# Patient Record
Sex: MALE | NOT HISPANIC OR LATINO | URBAN - METROPOLITAN AREA
[De-identification: names, ages, dates, MRNs, and addresses within clinical notes are randomized per-mention and may not be internally consistent; named-entity substitution may affect disease eponyms.]

---

## 2023-07-24 ENCOUNTER — OFFICE VISIT (OUTPATIENT)
Dept: FAMILY MEDICINE | Facility: CLINIC | Age: 63
End: 2023-07-24
Payer: COMMERCIAL

## 2023-07-24 VITALS
HEIGHT: 69 IN | DIASTOLIC BLOOD PRESSURE: 80 MMHG | RESPIRATION RATE: 18 BRPM | SYSTOLIC BLOOD PRESSURE: 117 MMHG | WEIGHT: 212 LBS | BODY MASS INDEX: 31.4 KG/M2 | TEMPERATURE: 98 F

## 2023-07-24 DIAGNOSIS — E11.9 INSULIN DEPENDENT TYPE 2 DIABETES MELLITUS: ICD-10-CM

## 2023-07-24 DIAGNOSIS — M17.0 PRIMARY OSTEOARTHRITIS OF BOTH KNEES: ICD-10-CM

## 2023-07-24 DIAGNOSIS — I50.9 CONGESTIVE HEART FAILURE, UNSPECIFIED HF CHRONICITY, UNSPECIFIED HEART FAILURE TYPE: ICD-10-CM

## 2023-07-24 DIAGNOSIS — E78.2 MIXED HYPERLIPIDEMIA: ICD-10-CM

## 2023-07-24 DIAGNOSIS — Z79.4 INSULIN DEPENDENT TYPE 2 DIABETES MELLITUS: ICD-10-CM

## 2023-07-24 DIAGNOSIS — Z00.00 ANNUAL PHYSICAL EXAM: Primary | ICD-10-CM

## 2023-07-24 DIAGNOSIS — I25.10 CORONARY ARTERY DISEASE, UNSPECIFIED VESSEL OR LESION TYPE, UNSPECIFIED WHETHER ANGINA PRESENT, UNSPECIFIED WHETHER NATIVE OR TRANSPLANTED HEART: ICD-10-CM

## 2023-07-24 PROBLEM — I25.810 CORONARY ARTERY DISEASE INVOLVING CORONARY BYPASS GRAFT OF NATIVE HEART WITHOUT ANGINA PECTORIS: Status: ACTIVE | Noted: 2023-07-24

## 2023-07-24 PROCEDURE — 3074F PR MOST RECENT SYSTOLIC BLOOD PRESSURE < 130 MM HG: ICD-10-PCS | Mod: CPTII,,, | Performed by: STUDENT IN AN ORGANIZED HEALTH CARE EDUCATION/TRAINING PROGRAM

## 2023-07-24 PROCEDURE — 99386 PR PREVENTIVE VISIT,NEW,40-64: ICD-10-PCS | Mod: ,,, | Performed by: STUDENT IN AN ORGANIZED HEALTH CARE EDUCATION/TRAINING PROGRAM

## 2023-07-24 PROCEDURE — 4010F ACE/ARB THERAPY RXD/TAKEN: CPT | Mod: CPTII,,, | Performed by: STUDENT IN AN ORGANIZED HEALTH CARE EDUCATION/TRAINING PROGRAM

## 2023-07-24 PROCEDURE — 3008F PR BODY MASS INDEX (BMI) DOCUMENTED: ICD-10-PCS | Mod: CPTII,,, | Performed by: STUDENT IN AN ORGANIZED HEALTH CARE EDUCATION/TRAINING PROGRAM

## 2023-07-24 PROCEDURE — 3074F SYST BP LT 130 MM HG: CPT | Mod: CPTII,,, | Performed by: STUDENT IN AN ORGANIZED HEALTH CARE EDUCATION/TRAINING PROGRAM

## 2023-07-24 PROCEDURE — 3008F BODY MASS INDEX DOCD: CPT | Mod: CPTII,,, | Performed by: STUDENT IN AN ORGANIZED HEALTH CARE EDUCATION/TRAINING PROGRAM

## 2023-07-24 PROCEDURE — 1159F MED LIST DOCD IN RCRD: CPT | Mod: CPTII,,, | Performed by: STUDENT IN AN ORGANIZED HEALTH CARE EDUCATION/TRAINING PROGRAM

## 2023-07-24 PROCEDURE — 3079F DIAST BP 80-89 MM HG: CPT | Mod: CPTII,,, | Performed by: STUDENT IN AN ORGANIZED HEALTH CARE EDUCATION/TRAINING PROGRAM

## 2023-07-24 PROCEDURE — 1159F PR MEDICATION LIST DOCUMENTED IN MEDICAL RECORD: ICD-10-PCS | Mod: CPTII,,, | Performed by: STUDENT IN AN ORGANIZED HEALTH CARE EDUCATION/TRAINING PROGRAM

## 2023-07-24 PROCEDURE — 3079F PR MOST RECENT DIASTOLIC BLOOD PRESSURE 80-89 MM HG: ICD-10-PCS | Mod: CPTII,,, | Performed by: STUDENT IN AN ORGANIZED HEALTH CARE EDUCATION/TRAINING PROGRAM

## 2023-07-24 PROCEDURE — 4010F PR ACE/ARB THEARPY RXD/TAKEN: ICD-10-PCS | Mod: CPTII,,, | Performed by: STUDENT IN AN ORGANIZED HEALTH CARE EDUCATION/TRAINING PROGRAM

## 2023-07-24 PROCEDURE — 99386 PREV VISIT NEW AGE 40-64: CPT | Mod: ,,, | Performed by: STUDENT IN AN ORGANIZED HEALTH CARE EDUCATION/TRAINING PROGRAM

## 2023-07-24 RX ORDER — EVOLOCUMAB 140 MG/ML
140 INJECTION, SOLUTION SUBCUTANEOUS
COMMUNITY

## 2023-07-24 RX ORDER — SACUBITRIL AND VALSARTAN 24; 26 MG/1; MG/1
1 TABLET, FILM COATED ORAL 2 TIMES DAILY
COMMUNITY

## 2023-07-24 RX ORDER — FUROSEMIDE 20 MG/1
20 TABLET ORAL DAILY
COMMUNITY

## 2023-07-24 RX ORDER — CARVEDILOL 12.5 MG/1
12.5 TABLET ORAL 2 TIMES DAILY WITH MEALS
COMMUNITY

## 2023-07-24 RX ORDER — CLOPIDOGREL BISULFATE 75 MG/1
75 TABLET ORAL DAILY
COMMUNITY

## 2023-07-24 RX ORDER — AMIODARONE HYDROCHLORIDE 200 MG/1
200 TABLET ORAL DAILY
COMMUNITY

## 2023-07-24 NOTE — PROGRESS NOTES
Subjective:      Patient ID: Darian Delgado is a 63 y.o.  male.    Chief Complaint: Establish Care/Wellness    Preventative Health: Patient not amenable to any vaccinations at this time. Patient not amenable to colon CA screening at this time.    IDDMII: Patient taking Novolin 70/30 PRN based on a sliding scale.    CHF/Hx of CAGBx4/HLD: Patient following with Dr. Moshe Rahman with Cardiology. He had quadruple bypass surgery 6 weeks ago. Patient taking Amiodarone, Coreg, Plavix, Repatha, Lasix, and Entresto.    Osteoarthritis of Bilateral Knees: Patient not able to take Tylenol or NSAIDs. He completed 1 PRP injection and 1 A2M injection in [place], [place]. He doesn't want to undergo knee replacement surgery.    Nicotine Dependence: Patient started smoking at 11-12 years old. Patient was averaging 1ppd x 45 years. He quit smoking September 2021. Patient not amenable to LDCT for lung CA screening at this time.    Review of Systems   Constitutional:  Negative for activity change, appetite change, chills, diaphoresis, fatigue, fever and unexpected weight change.   Eyes:  Negative for visual disturbance.   Respiratory:  Negative for apnea, cough, shortness of breath, wheezing and stridor.    Cardiovascular:  Negative for chest pain, palpitations and leg swelling.   Gastrointestinal:  Negative for abdominal pain, blood in stool, constipation, diarrhea, nausea and vomiting.   Genitourinary:  Negative for dysuria and hematuria.   Musculoskeletal:  Positive for arthralgias (bilateral knees). Negative for gait problem, joint swelling and myalgias.   Skin:  Negative for rash and wound.   Neurological:  Negative for dizziness, syncope, weakness, numbness and headaches.   Psychiatric/Behavioral:  Negative for behavioral problems, dysphoric mood and sleep disturbance. The patient is not nervous/anxious.      Objective:   /80 (BP Location: Right arm, Patient Position: Sitting, BP Method: Small (Automatic))    "Temp 98 °F (36.7 °C) (Temporal)   Resp 18   Ht 5' 9" (1.753 m)   Wt 96.2 kg (212 lb)   BMI 31.31 kg/m²     Physical Exam  Vitals and nursing note reviewed.   Constitutional:       General: He is not in acute distress.     Appearance: Normal appearance. He is not ill-appearing or toxic-appearing.   HENT:      Head: Normocephalic and atraumatic.      Mouth/Throat:      Mouth: Mucous membranes are moist.      Pharynx: Oropharynx is clear.   Eyes:      Conjunctiva/sclera: Conjunctivae normal.   Cardiovascular:      Rate and Rhythm: Normal rate and regular rhythm.      Heart sounds: Normal heart sounds. No murmur heard.  Pulmonary:      Effort: Pulmonary effort is normal. No respiratory distress.      Breath sounds: Normal breath sounds. No wheezing.   Abdominal:      General: Bowel sounds are normal. There is no distension.      Palpations: Abdomen is soft.      Tenderness: There is no abdominal tenderness.   Musculoskeletal:         General: No deformity. Normal range of motion.      Cervical back: Neck supple. No tenderness.      Right lower leg: No edema.      Left lower leg: No edema.   Lymphadenopathy:      Cervical: No cervical adenopathy.   Skin:     General: Skin is warm and dry.      Findings: No lesion or rash.   Neurological:      General: No focal deficit present.      Mental Status: He is alert.      Motor: No weakness.      Coordination: Coordination normal.   Psychiatric:         Mood and Affect: Mood normal.         Behavior: Behavior normal.         Thought Content: Thought content normal.         Judgment: Judgment normal.     Assessment/Plan:   1. Annual physical exam  Comments:  - Patient not amenable to any vaccinations at this time.   - Patient not amenable to colon CA screening at this time.  - Medical record request for notes/labs.     2. Insulin dependent type 2 diabetes mellitus  Assessment & Plan:  - Medical record request for labs.      3. Congestive heart failure, unspecified HF " chronicity, unspecified heart failure type  Assessment & Plan:  - Stable.  - Patient following with Dr. Moshe Rahman with Cardiology.  - Amiodarone, Coreg, Plavix, Repatha, Lasix, and Entresto per Cardiology.      4. Coronary artery disease, unspecified vessel or lesion type, unspecified whether angina present, unspecified whether native or transplanted heart  Assessment & Plan:  - He had quadruple bypass surgery 6 weeks ago.   - Refer to CHF plan.      5. Mixed hyperlipidemia  Assessment & Plan:  - Patient states he his unable to take statins due to history of elevated liver enzymes.  - Refer to CHF plan.      6. Primary osteoarthritis of both knees  Assessment & Plan:  - Patient not able to take Tylenol or NSAIDs.  - He completed 1 PRP injection and 1 A2M injection in [place], [place].   - He doesn't want to undergo knee replacement surgery.         Follow up in about 3 months (around 10/24/2023) for Chronic Medical Management.

## 2023-07-24 NOTE — ASSESSMENT & PLAN NOTE
- Patient not able to take Tylenol or NSAIDs.  - He completed 1 PRP injection and 1 A2M injection in [place], [place].   - He doesn't want to undergo knee replacement surgery.

## 2023-07-24 NOTE — ASSESSMENT & PLAN NOTE
- Stable.  - Patient following with Dr. Moshe Rahman with Cardiology.  - Amiodarone, Coreg, Plavix, Repatha, Lasix, and Entresto per Cardiology.

## 2023-07-24 NOTE — ASSESSMENT & PLAN NOTE
- Patient states he his unable to take statins due to history of elevated liver enzymes.  - Refer to CHF plan.

## 2023-08-29 ENCOUNTER — TELEPHONE (OUTPATIENT)
Dept: FAMILY MEDICINE | Facility: CLINIC | Age: 63
End: 2023-08-29
Payer: COMMERCIAL

## 2023-08-29 NOTE — TELEPHONE ENCOUNTER
No lab orders in patient's chart. Patient can get lipid panel with Cardiology or we can order the lipid panel and fax the results to Cardiology.

## 2023-08-29 NOTE — TELEPHONE ENCOUNTER
Dr. Candelario May office sent a fax requesting pt most recent FLP be sent to them but patient has not had any labs ordered or completed yet.

## 2023-08-30 NOTE — TELEPHONE ENCOUNTER
Tried to contact Dr. Poole's office regarding this but they did not answer I left a message for them to call the office back regarding this.